# Patient Record
Sex: FEMALE | Race: WHITE | NOT HISPANIC OR LATINO
[De-identification: names, ages, dates, MRNs, and addresses within clinical notes are randomized per-mention and may not be internally consistent; named-entity substitution may affect disease eponyms.]

---

## 2022-06-10 ENCOUNTER — APPOINTMENT (OUTPATIENT)
Dept: ORTHOPEDIC SURGERY | Facility: CLINIC | Age: 57
End: 2022-06-10

## 2022-06-20 PROBLEM — Z00.00 ENCOUNTER FOR PREVENTIVE HEALTH EXAMINATION: Status: ACTIVE | Noted: 2022-06-20

## 2022-06-27 ENCOUNTER — APPOINTMENT (OUTPATIENT)
Dept: ORTHOPEDIC SURGERY | Facility: CLINIC | Age: 57
End: 2022-06-27
Payer: COMMERCIAL

## 2022-06-27 VITALS — BODY MASS INDEX: 39.27 KG/M2 | HEIGHT: 64 IN | WEIGHT: 230 LBS

## 2022-06-27 DIAGNOSIS — E11.9 TYPE 2 DIABETES MELLITUS W/OUT COMPLICATIONS: ICD-10-CM

## 2022-06-27 PROCEDURE — 99213 OFFICE O/P EST LOW 20 MIN: CPT

## 2022-06-27 NOTE — PHYSICAL EXAM
complication, not stated as uncontrolled        PAST SURGICAL HISTORY    Past Surgical History:   Procedure Laterality Date    CARDIAC CATHETERIZATION      CAROTID ENDARTERECTOMY Right 4-9-2015    CHOLECYSTECTOMY      [facility] OFFICE/OUTPT VISIT,PROCEDURE ONLY Left 10/5/2018    AMPUTATION LEFT GREAT DIGIT performed by Pako Marr DPM at [facility] [address] OFFICE/OUTPT [address] [place] [address] Left 10/22/2018    INCISION AND DRAINAGE LEFT FOOT FIRST AND SECOND RAY AMPUTATION (DIGITS ONE, TWO AND METARSAL ONE AND TWO) performed by Pako Marr DPM at [facility] [address] OFFICE/OUTPT [address] [place] [address] Left 10/26/2018    LEFT FOOT WOUND DEBRIDEMENT WITH PRIMARY CLOSURE performed by Pako Marr DPM at [facility] [address] OFFICE/OUTPT [address] [place] [address] Left 11/2/2018    INCISION AND DRAINAGE WITH TRANSMETATARSAL CONVERTED TO LIST MART  AMPUTATION LEFT FOOT performed by Pako Marr DPM at [address] [place] [address] Pl TOE AMPUTATION Left 10/05/2018    left great toe amputation       FAMILY HISTORY    Family History   Problem Relation Age of Onset    High Blood Pressure Mother        SOCIAL HISTORY    Social History     Tobacco Use    Smoking status: Never Smoker    Smokeless tobacco: Never Used   Substance Use Topics    Alcohol use: No    Drug use: No     Types: Marijuana     Comment: QUIT 4 MONTHS        ALLERGIES    No Known Allergies    MEDICATIONS    Current Outpatient Medications on File Prior to Encounter   Medication Sig Dispense Refill    atorvastatin (LIPITOR) 40 MG tablet Take 1 tablet by mouth every morning 30 tablet 0    clopidogrel (PLAVIX) 75 MG tablet Take 1 tablet by mouth daily 30 tablet 3    metoprolol succinate (TOPROL XL) 25 MG extended release tablet Take 25 mg by mouth daily      vitamin B-12 (CYANOCOBALAMIN) 1000 MCG tablet Take 1,000 mcg by mouth daily  2    ranitidine (ZANTAC) 150 MG tablet Take 150 mg by mouth 2 times daily  1    Multiple Vitamins-Minerals (THERAPEUTIC MULTIVITAMIN-MINERALS) tablet Take 1 tablet by mouth daily      aspirin (ASPIRIN LOW DOSE) 81 MG EC tablet Take 1 tablet by mouth daily 30 tablet 0    lisinopril (PRINIVIL;ZESTRIL) 40 MG tablet Take 40 mg by mouth every morning       acetaminophen (AMINOFEN) 325 MG tablet Take 2 tablets by mouth every 6 hours as needed for Pain 60 tablet 0    Dulaglutide (TRULICITY) 1.5 TF/6.9KS SOPN Inject 1.5 mg into the skin once a week 12 pen 0    insulin glargine (BASAGLAR KWIKPEN) 100 UNIT/ML injection pen Inject 15 Units into the skin nightly (Patient taking differently: Inject 30 Units into the skin every morning ) 5 pen 3    Blood Glucose Monitoring Suppl (FREESTYLE LITE) INEZ 1 Device by Does not apply route 3 times daily (before meals) 1 Device 0    blood glucose test strips (FREESTYLE LITE) strip 1 each by In Vitro route 3 times daily As needed. 100 each 3    FREESTYLE LANCETS MISC 1 each by Does not apply route daily 100 each 3    Insulin Disposable Pump (V-GO 40) KIT by Does not apply route 6 CLICKS DAILY      Insulin Pen Needle (B-D UF III MINI PEN NEEDLES) 31G X 5 MM MISC 1 each by Does not apply route 4 times daily (before meals and nightly) 150 each 6     No current facility-administered medications on file prior to encounter. REVIEW OF SYSTEMS    Pertinent items are noted in HPI. Objective:      /84   Pulse 95   Temp 98 °F (36.7 °C) (Oral)   Resp 16     Wt Readings from Last 3 Encounters:   08/07/19 271 lb 13.2 oz (123.3 kg)   08/07/19 251 lb (113.9 kg)   05/01/19 255 lb 8.2 oz (115.9 kg)       PHYSICAL EXAM    Patient is alert and oriented x 3, and is in no acute distress.     Vascular: DP weakly palpable bilateral. PT pulse not palpable bilateral but audible on doppler exam. No Pedal hair noted bilateral. No Edema noted to ankles.    Derm:  Skin is warm to warm from proximal leg to distal foot bilateral. Toenails 1-5 on right foot are thickened, yellow and dystrophic.   Neuro:  Protective sensation absent to 10/10 [Extension] : extension [Rotation to right] : rotation to right [] : no palpable masses

## 2022-06-27 NOTE — HISTORY OF PRESENT ILLNESS
[de-identified] : 06/27/22 Pt states her neck has gotten worse since her last movement and the pain travels down both arms.\par Patient Complaint - 6/2/21- She continues with pain neck with episodic radicular complaints into b/l fingers /tingling.\par mri of the neck we requested last visit but she has been hesitant to go. \par 12/28/20: bilateral shoulder pain since summer 2020. experiences some R jaw pain. morning tingling in fingertips, no\par numbness. left sided back. atraumatic.

## 2022-06-27 NOTE — REVIEW OF SYSTEMS
[Joint Pain] : joint pain [Joint Stiffness] : joint stiffness Detail Level: Detailed Depth Of Biopsy: dermis Was A Bandage Applied: Yes Size Of Lesion In Cm: 0 Anticipated Plan (Based On Presumed Biopsy Results): If positive, MOHS. Biopsy Type: H and E Biopsy Method: Dermablade Anesthesia Type: 1% lidocaine without epinephrine Anesthesia Volume In Cc (Will Not Render If 0): 0.5 Hemostasis: Drysol Wound Care: Petrolatum Dressing: bandage Destruction After The Procedure: No Type Of Destruction Used: Curettage Curettage Text: The wound bed was treated with curettage after the biopsy was performed. Cryotherapy Text: The wound bed was treated with cryotherapy after the biopsy was performed. Electrodesiccation Text: The wound bed was treated with electrodesiccation after the biopsy was performed. Electrodesiccation And Curettage Text: The wound bed was treated with electrodesiccation and curettage after the biopsy was performed. Silver Nitrate Text: The wound bed was treated with silver nitrate after the biopsy was performed. Lab: 6 Lab Facility: 3 Consent: Written consent was obtained and risks were reviewed including but not limited to scarring, infection, bleeding, scabbing, incomplete removal, nerve damage and allergy to anesthesia. Post-Care Instructions: I reviewed with the patient in detail post-care instructions. Patient is to keep the biopsy site dry overnight, and then apply bacitracin twice daily until healed. Patient may apply hydrogen peroxide soaks to remove any crusting. Notification Instructions: Patient will be notified of biopsy results. However, patient instructed to call the office if not contacted within 2 weeks. Billing Type: Third-Party Bill Information: Selecting Yes will display possible errors in your note based on the variables you have selected. This validation is only offered as a suggestion for you. PLEASE NOTE THAT THE VALIDATION TEXT WILL BE REMOVED WHEN YOU FINALIZE YOUR NOTE. IF YOU WANT TO FAX A PRELIMINARY NOTE YOU WILL NEED TO TOGGLE THIS TO 'NO' IF YOU DO NOT WANT IT IN YOUR FAXED NOTE.

## 2022-07-26 ENCOUNTER — APPOINTMENT (OUTPATIENT)
Dept: ORTHOPEDIC SURGERY | Facility: CLINIC | Age: 57
End: 2022-07-26

## 2022-07-26 PROCEDURE — 99213 OFFICE O/P EST LOW 20 MIN: CPT

## 2022-07-26 RX ORDER — IBUPROFEN 800 MG/1
800 TABLET, FILM COATED ORAL 3 TIMES DAILY
Qty: 60 | Refills: 2 | Status: ACTIVE | COMMUNITY
Start: 2022-07-26 | End: 1900-01-01

## 2022-07-26 NOTE — HISTORY OF PRESENT ILLNESS
[de-identified] : 06/27/22 Pt states her neck has gotten worse since her last movement and the pain travels down both arms.\par Patient Complaint - 6/2/21- She continues with pain neck with episodic radicular complaints into b/l fingers /tingling.\par mri of the neck we requested last visit but she has been hesitant to go. \par 12/28/20: bilateral shoulder pain since summer 2020. experiences some R jaw pain. morning tingling in fingertips, no\par numbness. left sided back. atraumatic.

## 2022-07-26 NOTE — DISCUSSION/SUMMARY
[de-identified] : MRI cervical spine findings reviewed. Would benefit from course of PT, her insurance will be switching, she will call when this is straightened out and will start her on therapy

## 2022-07-26 NOTE — REASON FOR VISIT
[FreeTextEntry2] : 7/26/22- Had MRI: multilevel disc protrusions, disc/osteophyte complexes, impression on ventral cord, no myelomalacia\par 6/27/22- Continues to c/o neck pain to shoulders, numbness in hands

## 2022-12-22 ENCOUNTER — APPOINTMENT (OUTPATIENT)
Dept: ORTHOPEDIC SURGERY | Facility: CLINIC | Age: 57
End: 2022-12-22

## 2022-12-22 VITALS — HEIGHT: 64 IN | WEIGHT: 230 LBS | BODY MASS INDEX: 39.27 KG/M2

## 2022-12-22 DIAGNOSIS — M54.12 RADICULOPATHY, CERVICAL REGION: ICD-10-CM

## 2022-12-22 DIAGNOSIS — M48.02 SPINAL STENOSIS, CERVICAL REGION: ICD-10-CM

## 2022-12-22 DIAGNOSIS — M50.320 OTHER CERVICAL DISC DEGENERATION, MID-CERVICAL REGION, UNSPECIFIED LEVEL: ICD-10-CM

## 2022-12-22 PROCEDURE — 99213 OFFICE O/P EST LOW 20 MIN: CPT

## 2022-12-22 NOTE — REASON FOR VISIT
[FreeTextEntry2] : 12/22/22- Having pain right side of neck to right shoulder\par 7/26/22- Had MRI: multilevel disc protrusions, disc/osteophyte complexes, impression on ventral cord, no myelomalacia\par 6/27/22- Continues to c/o neck pain to shoulders, numbness in hands

## 2022-12-22 NOTE — HISTORY OF PRESENT ILLNESS
[de-identified] : 06/27/22 Pt states her neck has gotten worse since her last movement and the pain travels down both arms.\par Patient Complaint - 6/2/21- She continues with pain neck with episodic radicular complaints into b/l fingers /tingling.\par mri of the neck we requested last visit but she has been hesitant to go. \par 12/28/20: bilateral shoulder pain since summer 2020. experiences some R jaw pain. morning tingling in fingertips, no\par numbness. left sided back. atraumatic. [FreeTextEntry1] : l

## 2024-05-12 ENCOUNTER — NON-APPOINTMENT (OUTPATIENT)
Age: 59
End: 2024-05-12

## 2024-05-13 ENCOUNTER — APPOINTMENT (OUTPATIENT)
Dept: ORTHOPEDIC SURGERY | Facility: CLINIC | Age: 59
End: 2024-05-13
Payer: OTHER MISCELLANEOUS

## 2024-05-13 VITALS — HEIGHT: 64 IN | BODY MASS INDEX: 39.27 KG/M2 | WEIGHT: 230 LBS

## 2024-05-13 PROCEDURE — 99214 OFFICE O/P EST MOD 30 MIN: CPT | Mod: 25

## 2024-05-13 PROCEDURE — 20610 DRAIN/INJ JOINT/BURSA W/O US: CPT | Mod: RT

## 2024-05-13 PROCEDURE — 73030 X-RAY EXAM OF SHOULDER: CPT | Mod: RT

## 2024-05-13 RX ORDER — NAPROXEN 500 MG/1
500 TABLET ORAL
Qty: 60 | Refills: 2 | Status: ACTIVE | COMMUNITY
Start: 2024-05-13 | End: 1900-01-01

## 2024-05-13 NOTE — ASSESSMENT
[FreeTextEntry1] : Will inject right shoulder with cortisone Needs MRI to r/o rotator cuff tear PT for strengthening No lifting at work

## 2024-05-13 NOTE — HISTORY OF PRESENT ILLNESS
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 Has pain with certain movements. No numbness, no neck pain [] : no [FreeTextEntry1] : right shoulder  [FreeTextEntry3] : 5/724 [FreeTextEntry5] : patient was injured at work after lifting crate.

## 2024-05-13 NOTE — WORK
[Sprain/Strain] : sprain/strain [Was the competent medical cause of the injury] : was the competent medical cause of the injury [Are consistent with the injury] : are consistent with the injury [Consistent with my objective findings] : consistent with my objective findings [Moderate Partial] : moderate partial [Does not reveal pre-existing condition(s) that may affect treatment/prognosis] : does not reveal pre-existing condition(s) that may affect treatment/prognosis [Can return to work with limitations on ______] : can return to work with limitations on [unfilled] [Lifting] : lifting [Pulling/Pushing] : pulling/pushing [Unknown at this time] : : unknown at this time [Patient] : patient [No] : No [No Rx restrictions] : No Rx restrictions. [I provided the services listed above] :  I provided the services listed above. [Light Work:] : Light Work: Exerting up to 20 pounds of force occasionally, and/or up to 10 pounds of force frequently and/or negligible amount of force constantly to move objects. Physical demand requirements are in excess of those for Sedentary Work. Even though the weight lifted may only be a negligible amount, a job should be rated Light Work: (1) when it requires walking or standing to a significant degree: or (2) when it requires sitting most of the time but entails pushing and/or pulling of arm or leg controls: and/or (3) when the job requires working at a production rate pace entailing the constant pushing and/or pulling of materials even though the weight of those materials is negligible. NOTE: The constant stress of maintaining a production rate pace, especially in an industrial setting, can be and is physically demanding of a worker even though the amount of force exerted is negligible.

## 2024-06-24 ENCOUNTER — APPOINTMENT (OUTPATIENT)
Dept: ORTHOPEDIC SURGERY | Facility: CLINIC | Age: 59
End: 2024-06-24
Payer: OTHER MISCELLANEOUS

## 2024-06-24 VITALS — WEIGHT: 230 LBS | HEIGHT: 64 IN | BODY MASS INDEX: 39.27 KG/M2

## 2024-06-24 DIAGNOSIS — S43.431A SUPERIOR GLENOID LABRUM LESION OF RIGHT SHOULDER, INITIAL ENCOUNTER: ICD-10-CM

## 2024-06-24 DIAGNOSIS — S43.421A SPRAIN OF RIGHT ROTATOR CUFF CAPSULE, INITIAL ENCOUNTER: ICD-10-CM

## 2024-06-24 PROCEDURE — 99213 OFFICE O/P EST LOW 20 MIN: CPT

## 2024-09-05 ENCOUNTER — NON-APPOINTMENT (OUTPATIENT)
Age: 59
End: 2024-09-05

## 2024-09-05 ENCOUNTER — APPOINTMENT (OUTPATIENT)
Dept: ORTHOPEDIC SURGERY | Facility: CLINIC | Age: 59
End: 2024-09-05
Payer: OTHER MISCELLANEOUS

## 2024-09-05 VITALS — HEIGHT: 64 IN | BODY MASS INDEX: 39.27 KG/M2 | WEIGHT: 230 LBS

## 2024-09-05 DIAGNOSIS — S43.431A SUPERIOR GLENOID LABRUM LESION OF RIGHT SHOULDER, INITIAL ENCOUNTER: ICD-10-CM

## 2024-09-05 DIAGNOSIS — S43.421A SPRAIN OF RIGHT ROTATOR CUFF CAPSULE, INITIAL ENCOUNTER: ICD-10-CM

## 2024-09-05 PROCEDURE — 99213 OFFICE O/P EST LOW 20 MIN: CPT

## 2024-09-05 NOTE — REASON FOR VISIT
[FreeTextEntry2] : 09/05/2024 f/u right shoulder, still has pain with use of right arm 06/24/2024 Had MRI right shoulder: no rotator cuff tear, fraying and tear of labrum noted

## 2024-09-05 NOTE — HISTORY OF PRESENT ILLNESS
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 Has pain with certain movements. No numbness, no neck pain [] : no [FreeTextEntry1] : right shoulder  [FreeTextEntry3] : 5/724 [FreeTextEntry5] : patient was injured at work after lifting crate.

## 2024-09-05 NOTE — ASSESSMENT
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

## 2024-11-11 ENCOUNTER — APPOINTMENT (OUTPATIENT)
Dept: ORTHOPEDIC SURGERY | Facility: CLINIC | Age: 59
End: 2024-11-11
Payer: OTHER MISCELLANEOUS

## 2024-11-11 DIAGNOSIS — M48.02 SPINAL STENOSIS, CERVICAL REGION: ICD-10-CM

## 2024-11-11 DIAGNOSIS — M54.12 RADICULOPATHY, CERVICAL REGION: ICD-10-CM

## 2024-11-11 DIAGNOSIS — M50.320 OTHER CERVICAL DISC DEGENERATION, MID-CERVICAL REGION, UNSPECIFIED LEVEL: ICD-10-CM

## 2024-11-11 DIAGNOSIS — S43.421A SPRAIN OF RIGHT ROTATOR CUFF CAPSULE, INITIAL ENCOUNTER: ICD-10-CM

## 2024-11-11 DIAGNOSIS — S43.431A SUPERIOR GLENOID LABRUM LESION OF RIGHT SHOULDER, INITIAL ENCOUNTER: ICD-10-CM

## 2024-11-11 PROCEDURE — 99213 OFFICE O/P EST LOW 20 MIN: CPT

## 2024-12-27 ENCOUNTER — APPOINTMENT (OUTPATIENT)
Dept: ORTHOPEDIC SURGERY | Facility: CLINIC | Age: 59
End: 2024-12-27
Payer: OTHER MISCELLANEOUS

## 2024-12-27 DIAGNOSIS — M50.320 OTHER CERVICAL DISC DEGENERATION, MID-CERVICAL REGION, UNSPECIFIED LEVEL: ICD-10-CM

## 2024-12-27 DIAGNOSIS — S43.431A SUPERIOR GLENOID LABRUM LESION OF RIGHT SHOULDER, INITIAL ENCOUNTER: ICD-10-CM

## 2024-12-27 DIAGNOSIS — S43.421A SPRAIN OF RIGHT ROTATOR CUFF CAPSULE, INITIAL ENCOUNTER: ICD-10-CM

## 2024-12-27 DIAGNOSIS — M48.02 SPINAL STENOSIS, CERVICAL REGION: ICD-10-CM

## 2024-12-27 DIAGNOSIS — M54.12 RADICULOPATHY, CERVICAL REGION: ICD-10-CM

## 2024-12-27 PROCEDURE — 99213 OFFICE O/P EST LOW 20 MIN: CPT
